# Patient Record
Sex: FEMALE | URBAN - METROPOLITAN AREA
[De-identification: names, ages, dates, MRNs, and addresses within clinical notes are randomized per-mention and may not be internally consistent; named-entity substitution may affect disease eponyms.]

---

## 2021-02-18 ENCOUNTER — APPOINTMENT (RX ONLY)
Dept: URBAN - METROPOLITAN AREA CLINIC 77 | Facility: CLINIC | Age: 61
Setting detail: DERMATOLOGY
End: 2021-02-18

## 2021-02-18 ENCOUNTER — APPOINTMENT (OUTPATIENT)
Age: 61
Setting detail: DERMATOLOGY
End: 2021-02-18

## 2021-02-18 ENCOUNTER — APPOINTMENT (RX ONLY)
Dept: URBAN - METROPOLITAN AREA CLINIC 168 | Facility: CLINIC | Age: 61
Setting detail: DERMATOLOGY
End: 2021-02-18

## 2021-02-18 DIAGNOSIS — L73.8 OTHER SPECIFIED FOLLICULAR DISORDERS: ICD-10-CM

## 2021-02-18 PROCEDURE — 99212 OFFICE O/P EST SF 10 MIN: CPT

## 2021-02-18 PROCEDURE — ? DEFER

## 2021-02-18 PROCEDURE — ? COUNSELING

## 2021-02-18 ASSESSMENT — LOCATION DETAILED DESCRIPTION DERM
LOCATION DETAILED: SUPERIOR THORACIC SPINE
LOCATION DETAILED: SUPERIOR THORACIC SPINE
LOCATION DETAILED: RIGHT SUPERIOR MEDIAL UPPER BACK
LOCATION DETAILED: SUPERIOR THORACIC SPINE

## 2021-02-18 ASSESSMENT — LOCATION ZONE DERM
LOCATION ZONE: TRUNK

## 2021-02-18 ASSESSMENT — LOCATION SIMPLE DESCRIPTION DERM
LOCATION SIMPLE: UPPER BACK
LOCATION SIMPLE: RIGHT UPPER BACK
LOCATION SIMPLE: UPPER BACK
LOCATION SIMPLE: UPPER BACK
LOCATION SIMPLE: RIGHT UPPER BACK
LOCATION SIMPLE: RIGHT UPPER BACK

## 2021-02-18 NOTE — PROCEDURE: DEFER
Introduction Text (Please End With A Colon): The following procedure was deferred: punch excision
Procedure To Be Performed At Next Visit: Excision by punch method
Detail Level: Detailed

## 2021-03-10 ENCOUNTER — APPOINTMENT (OUTPATIENT)
Age: 61
Setting detail: DERMATOLOGY
End: 2021-03-10

## 2021-03-10 ENCOUNTER — APPOINTMENT (RX ONLY)
Dept: URBAN - METROPOLITAN AREA CLINIC 168 | Facility: CLINIC | Age: 61
Setting detail: DERMATOLOGY
End: 2021-03-10

## 2021-03-10 ENCOUNTER — APPOINTMENT (RX ONLY)
Dept: URBAN - METROPOLITAN AREA CLINIC 77 | Facility: CLINIC | Age: 61
Setting detail: DERMATOLOGY
End: 2021-03-10

## 2021-03-10 DIAGNOSIS — Z41.9 ENCOUNTER FOR PROCEDURE FOR PURPOSES OTHER THAN REMEDYING HEALTH STATE, UNSPECIFIED: ICD-10-CM

## 2021-03-10 PROCEDURE — ? DERMAPLANE

## 2021-03-10 ASSESSMENT — LOCATION DETAILED DESCRIPTION DERM
LOCATION DETAILED: LEFT CENTRAL MALAR CHEEK
LOCATION DETAILED: INFERIOR MID FOREHEAD
LOCATION DETAILED: RIGHT CENTRAL MALAR CHEEK
LOCATION DETAILED: INFERIOR MID FOREHEAD
LOCATION DETAILED: RIGHT CENTRAL MALAR CHEEK
LOCATION DETAILED: LEFT CENTRAL MALAR CHEEK
LOCATION DETAILED: RIGHT CENTRAL MALAR CHEEK
LOCATION DETAILED: LEFT CENTRAL MALAR CHEEK
LOCATION DETAILED: INFERIOR MID FOREHEAD

## 2021-03-10 ASSESSMENT — LOCATION ZONE DERM
LOCATION ZONE: FACE

## 2021-03-10 ASSESSMENT — LOCATION SIMPLE DESCRIPTION DERM
LOCATION SIMPLE: LEFT CHEEK
LOCATION SIMPLE: RIGHT CHEEK
LOCATION SIMPLE: RIGHT CHEEK
LOCATION SIMPLE: LEFT CHEEK
LOCATION SIMPLE: INFERIOR FOREHEAD
LOCATION SIMPLE: INFERIOR FOREHEAD
LOCATION SIMPLE: RIGHT CHEEK
LOCATION SIMPLE: INFERIOR FOREHEAD
LOCATION SIMPLE: LEFT CHEEK

## 2021-03-10 NOTE — HPI: COSMETIC (FACIAL)
Have You Had A Facial Before?: has not had a previous facial
Additional History: Dermaplaning performed add on peel $40\\nGrowth factor eye serum\\n

## 2021-03-10 NOTE — PROCEDURE: DERMAPLANE
Treatment Areas: face and neck
Blade: 10 blade scalpel
Post-Care Instructions: I reviewed with the patient in detail post-care instructions.
Pre-Procedure Text: The patient was placed in a recumbant position on the procedure table.
Price (Use Numbers Only, No Special Characters Or $): 106 Amanda Stern
Post-Procedure Instructions: Following the dermaplane procedure, Oxymist treatment was applied to the treatment areas. Moisturizer and SPF was applied.
Detail Level: Zone

## 2021-03-11 ENCOUNTER — APPOINTMENT (OUTPATIENT)
Age: 61
Setting detail: DERMATOLOGY
End: 2021-03-11

## 2021-03-11 ENCOUNTER — APPOINTMENT (RX ONLY)
Dept: URBAN - METROPOLITAN AREA CLINIC 168 | Facility: CLINIC | Age: 61
Setting detail: DERMATOLOGY
End: 2021-03-11

## 2021-03-11 ENCOUNTER — APPOINTMENT (RX ONLY)
Dept: URBAN - METROPOLITAN AREA CLINIC 77 | Facility: CLINIC | Age: 61
Setting detail: DERMATOLOGY
End: 2021-03-11

## 2021-03-11 DIAGNOSIS — D485 NEOPLASM OF UNCERTAIN BEHAVIOR OF SKIN: ICD-10-CM

## 2021-03-11 PROBLEM — D48.5 NEOPLASM OF UNCERTAIN BEHAVIOR OF SKIN: Status: ACTIVE | Noted: 2021-03-11

## 2021-03-11 PROCEDURE — 11104 PUNCH BX SKIN SINGLE LESION: CPT

## 2021-03-11 PROCEDURE — ? BIOPSY BY PUNCH METHOD

## 2021-03-11 PROCEDURE — 11105 PUNCH BX SKIN EA SEP/ADDL: CPT

## 2021-03-11 ASSESSMENT — LOCATION SIMPLE DESCRIPTION DERM
LOCATION SIMPLE: UPPER BACK
LOCATION SIMPLE: UPPER BACK
LOCATION SIMPLE: RIGHT UPPER BACK
LOCATION SIMPLE: UPPER BACK

## 2021-03-11 ASSESSMENT — LOCATION ZONE DERM
LOCATION ZONE: TRUNK

## 2021-03-11 ASSESSMENT — LOCATION DETAILED DESCRIPTION DERM
LOCATION DETAILED: RIGHT SUPERIOR UPPER BACK
LOCATION DETAILED: INFERIOR THORACIC SPINE
LOCATION DETAILED: INFERIOR THORACIC SPINE
LOCATION DETAILED: RIGHT SUPERIOR UPPER BACK
LOCATION DETAILED: INFERIOR THORACIC SPINE
LOCATION DETAILED: RIGHT SUPERIOR UPPER BACK

## 2021-03-11 NOTE — PROCEDURE: BIOPSY BY PUNCH METHOD
Patient Will Remove Sutures At Home?: No
Wound Care: Polysporin ointment
Anesthesia Type: 1% lidocaine with epinephrine
X Depth Of Punch In Cm (Optional): 0
Biopsy Type: H and E
Detail Level: Detailed
Notification Instructions: Patient will be notified of biopsy results. However, patient instructed to call the office if not contacted within 2 weeks.
Information: Selecting Yes will display possible errors in your note based on the variables you have selected. This validation is only offered as a suggestion for you. PLEASE NOTE THAT THE VALIDATION TEXT WILL BE REMOVED WHEN YOU FINALIZE YOUR NOTE. IF YOU WANT TO FAX A PRELIMINARY NOTE YOU WILL NEED TO TOGGLE THIS TO 'NO' IF YOU DO NOT WANT IT IN YOUR FAXED NOTE.
Consent: Written consent was obtained and risks were reviewed including but not limited to scarring, infection, bleeding, scabbing, incomplete removal, nerve damage and allergy to anesthesia.
Home Suture Removal Text: Patient was provided a home suture removal kit and will remove their sutures at home. If they have any questions or difficulties they will call the office.
Validate Note Data (See Information Below): Yes
Suture Removal: 14 days
Home Suture Removal Text: Patient was provided a home suture removal kit and will remove their sutures at home.  If they have any questions or difficulties they will call the office.
Punch Size In Mm: 2
Post-Care Instructions: I reviewed with the patient in detail post-care instructions. Patient is to keep the biopsy site dry overnight, and then apply bacitracin twice daily until healed. Patient may apply hydrogen peroxide soaks to remove any crusting.
Billing Type: [employer]
Number Of Epidermal Sutures (Optional): 1
Hemostasis: None
Billing Type: Third-Party Bill
Epidermal Sutures: 4-0 Monosof
Dressing: bandage
Anesthesia Volume In Cc (Will Not Render If 0): 1.5

## 2021-03-24 ENCOUNTER — APPOINTMENT (OUTPATIENT)
Age: 61
Setting detail: DERMATOLOGY
End: 2021-03-24

## 2021-03-24 ENCOUNTER — APPOINTMENT (RX ONLY)
Dept: URBAN - METROPOLITAN AREA CLINIC 77 | Facility: CLINIC | Age: 61
Setting detail: DERMATOLOGY
End: 2021-03-24

## 2021-03-24 ENCOUNTER — APPOINTMENT (RX ONLY)
Dept: URBAN - METROPOLITAN AREA CLINIC 168 | Facility: CLINIC | Age: 61
Setting detail: DERMATOLOGY
End: 2021-03-24

## 2021-03-24 DIAGNOSIS — Z48.02 ENCOUNTER FOR REMOVAL OF SUTURES: ICD-10-CM

## 2021-03-24 DIAGNOSIS — L72.0 EPIDERMAL CYST: ICD-10-CM

## 2021-03-24 PROCEDURE — 99212 OFFICE O/P EST SF 10 MIN: CPT

## 2021-03-24 PROCEDURE — ? COUNSELING

## 2021-03-24 PROCEDURE — ? SUTURE REMOVAL (NO GLOBAL PERIOD)

## 2021-03-24 ASSESSMENT — LOCATION SIMPLE DESCRIPTION DERM
LOCATION SIMPLE: UPPER BACK
LOCATION SIMPLE: UPPER BACK
LOCATION SIMPLE: LEFT UPPER BACK
LOCATION SIMPLE: UPPER BACK

## 2021-03-24 ASSESSMENT — LOCATION DETAILED DESCRIPTION DERM
LOCATION DETAILED: SUPERIOR THORACIC SPINE
LOCATION DETAILED: LEFT SUPERIOR MEDIAL UPPER BACK
LOCATION DETAILED: SUPERIOR THORACIC SPINE
LOCATION DETAILED: LEFT SUPERIOR MEDIAL UPPER BACK
LOCATION DETAILED: SUPERIOR THORACIC SPINE
LOCATION DETAILED: LEFT SUPERIOR MEDIAL UPPER BACK

## 2021-03-24 ASSESSMENT — LOCATION ZONE DERM
LOCATION ZONE: TRUNK

## 2021-04-27 ENCOUNTER — APPOINTMENT (RX ONLY)
Age: 61
Setting detail: DERMATOLOGY
End: 2021-04-27

## 2021-04-27 ENCOUNTER — APPOINTMENT (OUTPATIENT)
Age: 61
Setting detail: DERMATOLOGY
End: 2021-04-27

## 2021-04-27 DIAGNOSIS — Z41.9 ENCOUNTER FOR PROCEDURE FOR PURPOSES OTHER THAN REMEDYING HEALTH STATE, UNSPECIFIED: ICD-10-CM

## 2021-04-27 PROCEDURE — ? TREATMENT REGIMEN

## 2021-04-27 PROCEDURE — ? COSMETIC CONSULTATION: FILLERS

## 2021-04-27 PROCEDURE — ? PRODUCT LINE (ZO SKIN HEALTH)

## 2021-04-27 ASSESSMENT — LOCATION DETAILED DESCRIPTION DERM
LOCATION DETAILED: LEFT CENTRAL MALAR CHEEK
LOCATION DETAILED: LEFT CHIN
LOCATION DETAILED: LEFT CENTRAL MALAR CHEEK
LOCATION DETAILED: LEFT CHIN

## 2021-04-27 ASSESSMENT — LOCATION SIMPLE DESCRIPTION DERM
LOCATION SIMPLE: LEFT CHEEK
LOCATION SIMPLE: LEFT CHEEK
LOCATION SIMPLE: CHIN
LOCATION SIMPLE: CHIN

## 2021-04-27 ASSESSMENT — LOCATION ZONE DERM
LOCATION ZONE: FACE
LOCATION ZONE: FACE

## 2021-04-27 NOTE — PROCEDURE: PRODUCT LINE ([facility])
Product 27 Application Directions: Apply to entire face and chest QAM
Product 3 Price (In Dollars - Numeric Only, No Special Characters Or $): 0.00
Product 15 Application Directions: Apply to eyes BID
Product 32 Units: 0
Name Of Product 24: Calming toner
Name Of Product 31: Wrinkle and Texture Repair
Name Of Product 20: Retinol Brightener
Name Of Product 19: Tretinoin 0.05% (20g)
Name Of Product 6: Daily Power Defense
Detail Level: Zone
Name Of Product 11: Firming Serum
Product 22 Application Directions: Apply to eyelids and crows feet BID
Name Of Product 16: Wrinkle Texture Repair
Product 20 Application Directions: Apply to entire face QOS as tolerated
Product 9 Application Directions: Apply HS
Name Of Product 5: Vitamin C serum
Name Of Product 29: ZO Broad SPF 50
Name Of Product 30: Radical Night Repair
Name Of Product 27: SPF Brush 40 Fair
Product 19 Application Directions: Apply QHS as tolerated
Name Of Product 7: Pore refiner
Product 8 Units: 1
Product 28 Application Directions: Apply to entire face QHS PRN
Name Of Product 8: Exfoliating accelerator
Product 6 Application Directions: Apply to entire face BID
Name Of Product 18: ZO SPF Primer
Product 29 Application Directions: Apply to entire face QAM and reapply as needed.
Name Of Product 1: Gentle cleanser
Name Of Product 4: Pigment control HQ4% RX
Product 31 Application Directions: Apply at bedtime 2 and increase to 3-4 x week as tolerated after cleansing and pad.
Product 10 Application Directions: Apply to entire face BID as directed
Name Of Product 17: Complexion renewal pads
Name Of Product 14: Dual Action Scrub
Product 2 Application Directions: 3 to 5 x week after cleansing
Product 4 Application Directions: Apply to entire face for pigmentation QAM
Risk Of Complication Category: No MDM
Name Of Product 25: Hydrafirm / Brightening Eye Cream
Assigning Risk Information: Per AMA, level of risk is based upon consequences of the problem(s) addressed at the encounter when appropriately treated. Risk also includes medical decision making related to the need to initiate or forego further testing, treatment and/or hospitalization. Over the counter medication are assigned a risk level of low. Prescription medication management is assigned a risk level of moderate.
Name Of Product 2: Exfoliating polish
Product 5 Application Directions: Apply QAM
Product 25 Application Directions: Apply to periorbital region QHS
Allow Plan To Count Towards E/M Coding: No (this will remove associated impression from E/M calculation)
Product 24 Application Directions: Apply to entire face BID after cleansing
Name Of Product 22: Intense Eye Repair Cream
Product 21 Application Directions: Swab BID after cleansing
Product 3 Application Directions: Apply to entire face BID after Daily Power Defense
Name Of Product 23: Gentle cleanser travel size
Name Of Product 28: Hydrating Cream
Product 1 Application Directions: Apply to entire face QHS
Name Of Product 15: Growth Factor Serum
Product 32 Application Directions: Apply to entire face QOS or alternating with Retinol
Product 18 Application Directions: Apply to entire face QAM
Name Of Product 9: Pigment HQ4% blending RX
Send Charges To Patient Encounter: No
Product 14 Application Directions: Scrub 3-4 times a week after cleansing
Product 11 Application Directions: Apply BID
Product 23 Application Directions: Cleanse face BID
Name Of Product 10: Complexion Renewal Kit
Name Of Product 26: Maryjaneve
Name Of Product 21: Oil Control Pads
Product 16 Application Directions: Apply to entire face QHS as tolerated
Name Of Product 3: Rozatrol
Name Of Product 12: Exfoliating polish travel size

## 2021-04-27 NOTE — PROCEDURE: PRODUCT LINE ([facility])
Product 7 Units: 0
Product 41 Price (In Dollars - Numeric Only, No Special Characters Or $): 0.00
Name Of Product 21: Oil Control Pads
Product 1 Application Directions: Apply to entire face QHS
Name Of Product 26: Shonave
Name Of Product 13: Radical night repair
Product 15 Application Directions: Apply to eyes BID
Product 7 Application Directions: Apply to entire face QAM
Product 23 Application Directions: Cleanse face BID
Product 28 Application Directions: Apply to entire face QHS PRN
Detail Level: Zone
Name Of Product 2: Exfoliating polish
Product 4 Application Directions: Apply to entire face for pigmentation QAM
Name Of Product 8: Exfoliating accelerator
Name Of Product 16: Wrinkle Texture Repair
Name Of Product 29: ZO Broad SPF 50
Product 10 Application Directions: Apply to entire face BID as directed
Product 31 Application Directions: Apply at bedtime 2 and increase to 3-4 x week as tolerated after cleansing and pad.
Name Of Product 19: Tretinoin 0.05% (20g)
Product 5 Application Directions: Apply QAM
Name Of Product 5: Vitamin C serum
Name Of Product 24: Calming toner
Product 21 Application Directions: Swab BID after cleansing
Assigning Risk Information: Per AMA, level of risk is based upon consequences of the problem(s) addressed at the encounter when appropriately treated. Risk also includes medical decision making related to the need to initiate or forego further testing, treatment and/or hospitalization. Over the counter medication are assigned a risk level of low. Prescription medication management is assigned a risk level of moderate.
Product 26 Application Directions: Apply to entire face BID
Name Of Product 11: Firming Serum
Name Of Product 32: Growth Factor Serum
Risk Of Complication Category: No MDM
Product 29 Units: 1
Product 2 Application Directions: 3 to 5 x week after cleansing
Name Of Product 6: Daily Power Defense
Product 16 Application Directions: Apply to entire face QHS as tolerated
Name Of Product 22: Intense Eye Repair Cream
Name Of Product 14: Dual Action Scrub
Product 29 Application Directions: Apply to entire face QAM and reapply as needed.
Name Of Product 27: SPF Brush 40 Fair
Allow Plan To Count Towards E/M Coding: No (this will remove associated impression from E/M calculation)
Name Of Product 3: Rozatrol
Product 19 Application Directions: Apply QHS as tolerated
Product 24 Application Directions: Apply to entire face BID after cleansing
Name Of Product 17: Complexion renewal pads
Name Of Product 9: Pigment HQ4% blending RX
Product 11 Application Directions: Apply BID
Send Charges To Patient Encounter: No
Name Of Product 20: Retinol Brightener
Name Of Product 25: Hydrafirm / Brightening Eye Cream
Product 22 Application Directions: Apply to eyelids and crows feet BID
Name Of Product 12: Exfoliating polish travel size
Product 27 Application Directions: Apply to entire face and chest QAM
Product 14 Application Directions: Scrub 3-4 times a week after cleansing
Name Of Product 1: Gentle cleanser
Name Of Product 7: Pore refiner
Name Of Product 23: Gentle cleanser travel size
Name Of Product 28: Hydrating Cream
Product 3 Application Directions: Apply to entire face BID after Daily Power Defense
Product 9 Application Directions: Apply HS
Product 32 Application Directions: Apply to entire face QOS or alternating with Retinol
Name Of Product 18: ZO SPF Primer
Product 25 Application Directions: Apply to periorbital region QHS
Name Of Product 4: Pigment control HQ4% RX
Product 20 Application Directions: Apply to entire face QOS as tolerated
Name Of Product 31: Wrinkle and Texture Repair
Name Of Product 10: Complexion Renewal Kit

## 2021-05-07 ENCOUNTER — APPOINTMENT (OUTPATIENT)
Age: 61
Setting detail: DERMATOLOGY
End: 2021-05-07

## 2021-05-07 ENCOUNTER — APPOINTMENT (RX ONLY)
Age: 61
Setting detail: DERMATOLOGY
End: 2021-05-07

## 2021-05-07 DIAGNOSIS — Z41.9 ENCOUNTER FOR PROCEDURE FOR PURPOSES OTHER THAN REMEDYING HEALTH STATE, UNSPECIFIED: ICD-10-CM

## 2021-05-07 PROCEDURE — ? FACIAL

## 2021-05-07 ASSESSMENT — LOCATION SIMPLE DESCRIPTION DERM
LOCATION SIMPLE: LEFT FOREHEAD
LOCATION SIMPLE: LEFT CHEEK
LOCATION SIMPLE: LEFT FOREHEAD
LOCATION SIMPLE: RIGHT CHEEK
LOCATION SIMPLE: RIGHT CHEEK
LOCATION SIMPLE: LEFT CHEEK

## 2021-05-07 ASSESSMENT — LOCATION DETAILED DESCRIPTION DERM
LOCATION DETAILED: LEFT INFERIOR CENTRAL MALAR CHEEK
LOCATION DETAILED: LEFT INFERIOR MEDIAL FOREHEAD
LOCATION DETAILED: LEFT INFERIOR MEDIAL FOREHEAD
LOCATION DETAILED: RIGHT INFERIOR CENTRAL MALAR CHEEK
LOCATION DETAILED: RIGHT INFERIOR CENTRAL MALAR CHEEK
LOCATION DETAILED: LEFT INFERIOR CENTRAL MALAR CHEEK

## 2021-05-07 ASSESSMENT — LOCATION ZONE DERM
LOCATION ZONE: FACE
LOCATION ZONE: FACE

## 2021-05-07 NOTE — PROCEDURE: FACIAL
Detail Level: Zone
Mask Type (Optional): hydrating B5
Exfoliation Type: dermaplane
Treatment Type (Optional): Sensitive
Extraction Method: 11 blade and comedo extractor
Facial Steaming: steamed
Price (Use Numbers Only, No Special Characters Or $): 75

## 2021-05-07 NOTE — PROCEDURE: FACIAL
Extraction Method: 11 blade and comedo extractor
Exfoliation Type: dermaplane
Facial Steaming: steamed
Treatment Type (Optional): Sensitive
Price (Use Numbers Only, No Special Characters Or $): 75
Mask Type (Optional): hydrating B5
Detail Level: Zone

## 2021-05-11 ENCOUNTER — APPOINTMENT (RX ONLY)
Age: 61
Setting detail: DERMATOLOGY
End: 2021-05-11

## 2021-05-11 ENCOUNTER — APPOINTMENT (OUTPATIENT)
Age: 61
Setting detail: DERMATOLOGY
End: 2021-05-11

## 2021-05-11 VITALS
DIASTOLIC BLOOD PRESSURE: 80 MMHG | SYSTOLIC BLOOD PRESSURE: 120 MMHG | SYSTOLIC BLOOD PRESSURE: 120 MMHG | DIASTOLIC BLOOD PRESSURE: 80 MMHG

## 2021-05-11 DIAGNOSIS — Z41.9 ENCOUNTER FOR PROCEDURE FOR PURPOSES OTHER THAN REMEDYING HEALTH STATE, UNSPECIFIED: ICD-10-CM

## 2021-05-11 PROCEDURE — ? OTHER

## 2021-05-11 PROCEDURE — ? FILLERS

## 2021-05-11 ASSESSMENT — LOCATION ZONE DERM
LOCATION ZONE: FACE
LOCATION ZONE: FACE
LOCATION ZONE: LIP
LOCATION ZONE: LIP

## 2021-05-11 ASSESSMENT — LOCATION DETAILED DESCRIPTION DERM
LOCATION DETAILED: RIGHT CHIN
LOCATION DETAILED: RIGHT SUPERIOR MEDIAL BUCCAL CHEEK
LOCATION DETAILED: RIGHT LOWER CUTANEOUS LIP
LOCATION DETAILED: RIGHT SUPERIOR MEDIAL BUCCAL CHEEK
LOCATION DETAILED: RIGHT CHIN
LOCATION DETAILED: RIGHT LOWER CUTANEOUS LIP
LOCATION DETAILED: RIGHT MEDIAL BUCCAL CHEEK
LOCATION DETAILED: LEFT CHIN
LOCATION DETAILED: RIGHT MEDIAL BUCCAL CHEEK
LOCATION DETAILED: LEFT CENTRAL BUCCAL CHEEK
LOCATION DETAILED: LEFT CHIN
LOCATION DETAILED: LEFT MEDIAL BUCCAL CHEEK
LOCATION DETAILED: LEFT LOWER CUTANEOUS LIP
LOCATION DETAILED: LEFT LOWER CUTANEOUS LIP
LOCATION DETAILED: LEFT MEDIAL BUCCAL CHEEK
LOCATION DETAILED: LEFT CENTRAL BUCCAL CHEEK

## 2021-05-11 ASSESSMENT — LOCATION SIMPLE DESCRIPTION DERM
LOCATION SIMPLE: RIGHT LIP
LOCATION SIMPLE: RIGHT CHEEK
LOCATION SIMPLE: CHIN
LOCATION SIMPLE: RIGHT LIP
LOCATION SIMPLE: RIGHT CHEEK
LOCATION SIMPLE: LEFT LIP
LOCATION SIMPLE: CHIN
LOCATION SIMPLE: LEFT LIP
LOCATION SIMPLE: LEFT CHEEK
LOCATION SIMPLE: LEFT CHEEK

## 2021-05-11 NOTE — PROCEDURE: OTHER
Note Text (......Xxx Chief Complaint.): This diagnosis correlates with the
Detail Level: Zone
Other (Free Text): 3 syringes of Aktana Ultra used
Render Risk Assessment In Note?: no

## 2021-05-11 NOTE — PROCEDURE: OTHER
Detail Level: Zone
Other (Free Text): 3 syringes of Novasentis Ultra used
Render Risk Assessment In Note?: no
Note Text (......Xxx Chief Complaint.): This diagnosis correlates with the

## 2021-05-11 NOTE — HPI: COSMETIC (FILLERS)
Have You Had Fillers Before?: has not had fillers
Additional History: She has had  January 2020 nasal labial folds done [facility].

## 2021-05-11 NOTE — PROCEDURE: FILLERS
Price (Use Numbers Only, No Special Characters Or $): 1500.00
Dorsal Hands Filler Volume In Cc: 0
Use Map Statement For Sites (Optional): Yes
Additional Area 2 Location: Chin
Consent: Written consent obtained. Risks include but not limited to bruising, beading, irregular texture, ulceration, infection, allergic reaction, scar formation, incomplete augmentation, temporary nature, procedural pain.
Map Statment: See Attach Map for Complete Details
Lot #: [id number]
Post-Care Instructions: Patient instructed to apply ice to reduce swelling.
Expiration Date (Month Year): 04/03/2022
Include Cannula Information In Note?: No
Anesthesia Type: 0.3% lidocaine (mixed within filler)
Lot #: [id number]
Expiration Date (Month Year): 03/21/2021
Filler: Juvederm Ultra XC
Filler Comments: Patient declined to participate in the [employer] rewards program.
Detail Level: Detailed

## 2021-05-11 NOTE — PROCEDURE: FILLERS
Lateral Face Filler Volume In Cc: 0
Include Cannula Information In Note?: No
Use Map Statement For Sites (Optional): Yes
Lot #: [id number]
Post-Care Instructions: Patient instructed to apply ice to reduce swelling.
Map Statment: See Attach Map for Complete Details
Price (Use Numbers Only, No Special Characters Or $): 1500.00
Expiration Date (Month Year): 04/03/2022
Additional Area 1 Location: chin
Anesthesia Type: 0.3% lidocaine (mixed within filler)
Consent: Written consent obtained. Risks include but not limited to bruising, beading, irregular texture, ulceration, infection, allergic reaction, scar formation, incomplete augmentation, temporary nature, procedural pain.
Expiration Date (Month Year): 03/21/2021
Lot #: [id number]
Detail Level: Detailed
Filler: Juvederm Ultra XC
Filler Comments: Patient declined to participate in the [employer] rewards program.

## 2021-06-01 ENCOUNTER — APPOINTMENT (OUTPATIENT)
Age: 61
Setting detail: DERMATOLOGY
End: 2021-06-01

## 2021-06-01 ENCOUNTER — APPOINTMENT (RX ONLY)
Age: 61
Setting detail: DERMATOLOGY
End: 2021-06-01

## 2021-06-01 DIAGNOSIS — I78.8 OTHER DISEASES OF CAPILLARIES: ICD-10-CM

## 2021-06-01 DIAGNOSIS — Z41.9 ENCOUNTER FOR PROCEDURE FOR PURPOSES OTHER THAN REMEDYING HEALTH STATE, UNSPECIFIED: ICD-10-CM

## 2021-06-01 PROCEDURE — ? FACIAL

## 2021-06-01 PROCEDURE — ? COSMETIC CONSULTATION: FILLERS

## 2021-06-01 PROCEDURE — ? TREATMENT REGIMEN

## 2021-06-01 PROCEDURE — ? COUNSELING

## 2021-06-01 PROCEDURE — ? BOTOX

## 2021-06-01 PROCEDURE — ? COSMETIC FOLLOW-UP

## 2021-06-01 ASSESSMENT — LOCATION SIMPLE DESCRIPTION DERM
LOCATION SIMPLE: RIGHT FOREHEAD
LOCATION SIMPLE: GLABELLA
LOCATION SIMPLE: CHIN
LOCATION SIMPLE: LEFT FOREHEAD
LOCATION SIMPLE: RIGHT CHEEK
LOCATION SIMPLE: RIGHT CHEEK
LOCATION SIMPLE: LEFT CHEEK
LOCATION SIMPLE: LEFT FOREHEAD
LOCATION SIMPLE: LEFT CHEEK
LOCATION SIMPLE: RIGHT CHEEK
LOCATION SIMPLE: RIGHT CHEEK
LOCATION SIMPLE: LEFT CHEEK
LOCATION SIMPLE: LEFT FOREHEAD
LOCATION SIMPLE: LEFT CHEEK
LOCATION SIMPLE: CHIN
LOCATION SIMPLE: LEFT FOREHEAD
LOCATION SIMPLE: GLABELLA
LOCATION SIMPLE: RIGHT FOREHEAD

## 2021-06-01 ASSESSMENT — LOCATION DETAILED DESCRIPTION DERM
LOCATION DETAILED: LEFT SUPERIOR LATERAL MALAR CHEEK
LOCATION DETAILED: LEFT INFERIOR CENTRAL MALAR CHEEK
LOCATION DETAILED: LEFT CHIN
LOCATION DETAILED: RIGHT INFERIOR MEDIAL FOREHEAD
LOCATION DETAILED: LEFT LATERAL FOREHEAD
LOCATION DETAILED: GLABELLA
LOCATION DETAILED: LEFT INFERIOR FOREHEAD
LOCATION DETAILED: RIGHT LATERAL FOREHEAD
LOCATION DETAILED: LEFT SUPERIOR FOREHEAD
LOCATION DETAILED: LEFT INFERIOR MEDIAL MALAR CHEEK
LOCATION DETAILED: LEFT SUPERIOR LATERAL MALAR CHEEK
LOCATION DETAILED: LEFT SUPERIOR CENTRAL MALAR CHEEK
LOCATION DETAILED: RIGHT INFERIOR CENTRAL MALAR CHEEK
LOCATION DETAILED: LEFT LATERAL FOREHEAD
LOCATION DETAILED: RIGHT LATERAL MALAR CHEEK
LOCATION DETAILED: LEFT INFERIOR MEDIAL FOREHEAD
LOCATION DETAILED: LEFT SUPERIOR CENTRAL MALAR CHEEK
LOCATION DETAILED: RIGHT LATERAL MALAR CHEEK
LOCATION DETAILED: RIGHT LATERAL FOREHEAD
LOCATION DETAILED: RIGHT SUPERIOR FOREHEAD
LOCATION DETAILED: LEFT CHIN
LOCATION DETAILED: RIGHT INFERIOR CENTRAL MALAR CHEEK
LOCATION DETAILED: RIGHT SUPERIOR LATERAL MALAR CHEEK
LOCATION DETAILED: RIGHT INFERIOR CENTRAL MALAR CHEEK
LOCATION DETAILED: LEFT INFERIOR FOREHEAD
LOCATION DETAILED: LEFT SUPERIOR FOREHEAD
LOCATION DETAILED: RIGHT INFERIOR CENTRAL MALAR CHEEK
LOCATION DETAILED: RIGHT SUPERIOR LATERAL MALAR CHEEK
LOCATION DETAILED: LEFT INFERIOR CENTRAL MALAR CHEEK
LOCATION DETAILED: RIGHT INFERIOR MEDIAL FOREHEAD
LOCATION DETAILED: LEFT INFERIOR MEDIAL MALAR CHEEK
LOCATION DETAILED: LEFT INFERIOR MEDIAL FOREHEAD
LOCATION DETAILED: GLABELLA
LOCATION DETAILED: RIGHT SUPERIOR FOREHEAD

## 2021-06-01 ASSESSMENT — LOCATION ZONE DERM
LOCATION ZONE: FACE

## 2021-06-01 NOTE — PROCEDURE: MIPS QUALITY
Quality 111:Pneumonia Vaccination Status For Older Adults: Pneumococcal Vaccination not Administered or Previously Received, Reason not Otherwise Specified
Detail Level: Detailed
Quality 226: Preventive Care And Screening: Tobacco Use: Screening And Cessation Intervention: Patient screened for tobacco use and is an ex/non-smoker
Quality 110: Preventive Care And Screening: Influenza Immunization: Influenza Immunization not Administered for Documented Reasons.
Quality 431: Preventive Care And Screening: Unhealthy Alcohol Use - Screening: Patient screened for unhealthy alcohol use using a single question and scores less than 2 times per year

## 2021-06-01 NOTE — PROCEDURE: MIPS QUALITY
Quality 226: Preventive Care And Screening: Tobacco Use: Screening And Cessation Intervention: Patient screened for tobacco use and is an ex/non-smoker
Quality 110: Preventive Care And Screening: Influenza Immunization: Influenza Immunization not Administered for Documented Reasons.
Quality 431: Preventive Care And Screening: Unhealthy Alcohol Use - Screening: Patient screened for unhealthy alcohol use using a single question and scores less than 2 times per year
Quality 111:Pneumonia Vaccination Status For Older Adults: Pneumococcal Vaccination not Administered or Previously Received, Reason not Otherwise Specified
Detail Level: Detailed

## 2021-06-01 NOTE — PROCEDURE: TREATMENT REGIMEN
Detail Level: Zone
Plan: Voluma in the cheeks.
Detail Level: Detailed
Plan: The patient is told that she can schedule a consultation for laser with Dr. Vincent.

## 2021-06-01 NOTE — PROCEDURE: TREATMENT REGIMEN
Plan: Voluma in the cheeks.
Detail Level: Zone
Plan: The patient is told that she can schedule a consultation for laser with Dr. Walsh.
Detail Level: Detailed

## 2021-06-01 NOTE — PROCEDURE: BOTOX
Additional Area 3 Units: 0
Show Additional Area 3: Yes
Show Lcl Units: No
Expiration Date (Month Year): 09/2023
Forehead Units: 12
Price (Use Numbers Only, No Special Characters Or $): 420.00
Lot #: [id number]
Consent: Written consent obtained. Risks include but not limited to lid/brow ptosis, bruising, swelling, diplopia, temporary effect, incomplete chemical denervation.
Periorbital Skin Units: 8
Detail Level: Detailed
Dilution (U/0.1 Cc): 5
Post-Care Instructions: Patient instructed to not lie down for 4 hours and limit physical activity for 24 hours. Patient instructed not to travel by airplane for 48 hours.
Glabellar Complex Units: 15

## 2021-06-01 NOTE — PROCEDURE: FACIAL
Extraction Method: 11 blade and comedo extractor
Exfoliation Type: dermaplane
Detail Level: Zone
Facial Steaming: steamed
Treatment Type (Optional): Sensitive
Price (Use Numbers Only, No Special Characters Or $): 75
Mask Type (Optional): hydrating B5

## 2021-06-01 NOTE — PROCEDURE: COSMETIC FOLLOW-UP
Side Effects Or Complications: Swelling
Detail Level: Detailed
Treatment (Optional): Filler Injection
Patient Satisfaction: Pleased

## 2021-06-01 NOTE — PROCEDURE: BOTOX
Show Right And Left Brow Units: No
Inferior Lateral Orbicularis Oculi Units: 0
Show Masseter Units: Yes
Price (Use Numbers Only, No Special Characters Or $): 420.00
Expiration Date (Month Year): 09/2023
Forehead Units: 12
Post-Care Instructions: Patient instructed to not lie down for 4 hours and limit physical activity for 24 hours. Patient instructed not to travel by airplane for 48 hours.
Glabellar Complex Units: 15
Dilution (U/0.1 Cc): 5
Detail Level: Detailed
Consent: Written consent obtained. Risks include but not limited to lid/brow ptosis, bruising, swelling, diplopia, temporary effect, incomplete chemical denervation.
Lot #: [id number]
Periorbital Skin Units: 8

## 2021-06-22 ENCOUNTER — APPOINTMENT (RX ONLY)
Age: 61
Setting detail: DERMATOLOGY
End: 2021-06-22

## 2021-06-22 ENCOUNTER — APPOINTMENT (OUTPATIENT)
Age: 61
Setting detail: DERMATOLOGY
End: 2021-06-22

## 2021-06-22 DIAGNOSIS — Z41.9 ENCOUNTER FOR PROCEDURE FOR PURPOSES OTHER THAN REMEDYING HEALTH STATE, UNSPECIFIED: ICD-10-CM

## 2021-06-22 PROCEDURE — ? COSMETIC CONSULTATION: GENERAL

## 2021-06-22 PROCEDURE — ? DIAGNOSIS COMMENT

## 2021-06-22 PROCEDURE — ? COSMETIC CONSULTATION: FILLERS

## 2021-06-22 PROCEDURE — ? BOTOX

## 2021-06-22 ASSESSMENT — LOCATION SIMPLE DESCRIPTION DERM
LOCATION SIMPLE: RIGHT CHEEK
LOCATION SIMPLE: LEFT CHEEK
LOCATION SIMPLE: LEFT ZYGOMA
LOCATION SIMPLE: LEFT CHEEK
LOCATION SIMPLE: LEFT ZYGOMA
LOCATION SIMPLE: RIGHT ZYGOMA
LOCATION SIMPLE: RIGHT CHEEK
LOCATION SIMPLE: RIGHT ZYGOMA

## 2021-06-22 ASSESSMENT — LOCATION DETAILED DESCRIPTION DERM
LOCATION DETAILED: LEFT MEDIAL ZYGOMA
LOCATION DETAILED: RIGHT INFERIOR MEDIAL MALAR CHEEK
LOCATION DETAILED: LEFT MEDIAL ZYGOMA
LOCATION DETAILED: LEFT INFERIOR CENTRAL MALAR CHEEK
LOCATION DETAILED: LEFT SUPERIOR MEDIAL BUCCAL CHEEK
LOCATION DETAILED: RIGHT INFERIOR CENTRAL MALAR CHEEK
LOCATION DETAILED: RIGHT MEDIAL ZYGOMA
LOCATION DETAILED: RIGHT INFERIOR CENTRAL MALAR CHEEK
LOCATION DETAILED: RIGHT INFERIOR MEDIAL MALAR CHEEK
LOCATION DETAILED: LEFT SUPERIOR MEDIAL BUCCAL CHEEK
LOCATION DETAILED: LEFT INFERIOR CENTRAL MALAR CHEEK
LOCATION DETAILED: RIGHT MEDIAL ZYGOMA

## 2021-06-22 ASSESSMENT — LOCATION ZONE DERM
LOCATION ZONE: FACE
LOCATION ZONE: FACE

## 2021-06-22 NOTE — PROCEDURE: BOTOX
Detail Level: Detailed
Show Nasal Units: Yes
Periorbital Skin Units: 4
Expiration Date (Month Year): 08/2022
Anterior Platysmal Bands Units: 0
Lot #: [id number]
Show Mentalis Units: No
Consent: Written consent obtained. Risks include but not limited to lid/brow ptosis, bruising, swelling, diplopia, temporary effect, incomplete chemical denervation.
Dilution (U/0.1 Cc): 5
Post-Care Instructions: Patient instructed to not lie down for 4 hours and limit physical activity for 24 hours. Patient instructed not to travel by airplane for 48 hours.

## 2021-06-22 NOTE — PROCEDURE: DIAGNOSIS COMMENT
Comment: 2 units of Botox in each crows feet added- complimentary samples used from [facility]
Detail Level: Simple
Render Risk Assessment In Note?: no

## 2021-06-22 NOTE — PROCEDURE: BOTOX
L Brow Units: 0
Show Additional Area 5: Yes
Detail Level: Detailed
Show Right And Left Pupillary Line Units: No
Lot #: [id number]
Expiration Date (Month Year): 08/2022
Consent: Written consent obtained. Risks include but not limited to lid/brow ptosis, bruising, swelling, diplopia, temporary effect, incomplete chemical denervation.
Periorbital Skin Units: 4
Post-Care Instructions: Patient instructed to not lie down for 4 hours and limit physical activity for 24 hours. Patient instructed not to travel by airplane for 48 hours.
Dilution (U/0.1 Cc): 5

## 2021-06-22 NOTE — PROCEDURE: MIPS QUALITY
Quality 110: Preventive Care And Screening: Influenza Immunization: Influenza Immunization not Administered for Documented Reasons.
Detail Level: Detailed
Quality 111:Pneumonia Vaccination Status For Older Adults: Pneumococcal Vaccination not Administered or Previously Received, Reason not Otherwise Specified
Quality 226: Preventive Care And Screening: Tobacco Use: Screening And Cessation Intervention: Patient screened for tobacco use and is an ex/non-smoker
Quality 431: Preventive Care And Screening: Unhealthy Alcohol Use - Screening: Patient screened for unhealthy alcohol use using a single question and scores less than 2 times per year

## 2021-06-22 NOTE — PROCEDURE: DIAGNOSIS COMMENT
Detail Level: Simple
Comment: 2 units of Botox in each crows feet added- complimentary samples used from [facility]
Render Risk Assessment In Note?: no

## 2021-06-29 ENCOUNTER — APPOINTMENT (OUTPATIENT)
Age: 61
Setting detail: DERMATOLOGY
End: 2021-06-29

## 2021-06-29 ENCOUNTER — APPOINTMENT (RX ONLY)
Age: 61
Setting detail: DERMATOLOGY
End: 2021-06-29

## 2021-06-29 DIAGNOSIS — Z41.9 ENCOUNTER FOR PROCEDURE FOR PURPOSES OTHER THAN REMEDYING HEALTH STATE, UNSPECIFIED: ICD-10-CM

## 2021-06-29 PROCEDURE — ? FILLERS

## 2021-06-29 ASSESSMENT — LOCATION DETAILED DESCRIPTION DERM
LOCATION DETAILED: LEFT CENTRAL MALAR CHEEK
LOCATION DETAILED: RIGHT UPPER CUTANEOUS LIP
LOCATION DETAILED: RIGHT ORAL COMMISSURE
LOCATION DETAILED: LEFT LATERAL MALAR CHEEK
LOCATION DETAILED: RIGHT LATERAL MALAR CHEEK
LOCATION DETAILED: LEFT CENTRAL MALAR CHEEK
LOCATION DETAILED: LEFT LATERAL MALAR CHEEK
LOCATION DETAILED: LEFT INFERIOR VERMILION LIP
LOCATION DETAILED: RIGHT UPPER CUTANEOUS LIP
LOCATION DETAILED: LEFT SUPERIOR VERMILION LIP
LOCATION DETAILED: RIGHT ORAL COMMISSURE
LOCATION DETAILED: RIGHT LATERAL MALAR CHEEK
LOCATION DETAILED: LEFT INFERIOR VERMILION LIP
LOCATION DETAILED: RIGHT LOWER CUTANEOUS LIP
LOCATION DETAILED: RIGHT LOWER CUTANEOUS LIP
LOCATION DETAILED: LEFT SUPERIOR VERMILION LIP

## 2021-06-29 ASSESSMENT — LOCATION SIMPLE DESCRIPTION DERM
LOCATION SIMPLE: LEFT CHEEK
LOCATION SIMPLE: RIGHT CHEEK
LOCATION SIMPLE: RIGHT LIP
LOCATION SIMPLE: RIGHT CHEEK
LOCATION SIMPLE: RIGHT LIP
LOCATION SIMPLE: LEFT LIP
LOCATION SIMPLE: LEFT LIP
LOCATION SIMPLE: LEFT CHEEK

## 2021-06-29 ASSESSMENT — LOCATION ZONE DERM
LOCATION ZONE: FACE
LOCATION ZONE: FACE
LOCATION ZONE: LIP
LOCATION ZONE: LIP

## 2021-06-29 NOTE — PROCEDURE: FILLERS
Vermilion Lips Filler Volume In Cc: 0
Post-Care Instructions: Patient instructed to apply ice to reduce swelling.
Detail Level: Detailed
Marionette Lines Filler Volume In Cc: 1
Include Cannula Information In Note?: No
Filler: Juvederm Voluma XC
Anesthesia Expiration Date (Month Year): 03-
Lot #: [id number]
Price (Use Numbers Only, No Special Characters Or $): 2100.00
Anesthesia Type: 0.3% lidocaine (mixed within filler)
Expiration Date (Month Year): 03/15/2022
Use Map Statement For Sites (Optional): Yes
Map Statment: See Attach Map for Complete Details
Lot #: [id number]
Consent: Written consent obtained. Risks include but not limited to bruising, beading, irregular texture, ulceration, infection, allergic reaction, scar formation, incomplete augmentation, temporary nature, procedural pain.
Additional Area 2 Location: Chin
Expiration Date (Month Year): 06/14/2022
Anesthesia Lot #: [id number]
Filler: Juvederm Ultra XC

## 2021-06-29 NOTE — PROCEDURE: FILLERS
Additional Area 4 Volume In Cc: 0
Filler: Juvederm Ultra XC
Lot #: [id number]
Include Cannula Information In Note?: No
Expiration Date (Month Year): 03/15/2022
Anesthesia Lot #: [id number]
Anesthesia Expiration Date (Month Year): 03-
Marionette Lines Filler Volume In Cc: 1
Filler: Juvederm Voluma XC
Lot #: [id number]
Detail Level: Detailed
Price (Use Numbers Only, No Special Characters Or $): 2100.00
Expiration Date (Month Year): 06/14/2022
Use Map Statement For Sites (Optional): Yes
Map Statment: See Attach Map for Complete Details
Additional Area 2 Location: Chin
Anesthesia Type: 0.3% lidocaine (mixed within filler)
Post-Care Instructions: Patient instructed to apply ice to reduce swelling.
Consent: Written consent obtained. Risks include but not limited to bruising, beading, irregular texture, ulceration, infection, allergic reaction, scar formation, incomplete augmentation, temporary nature, procedural pain.

## 2021-12-01 ENCOUNTER — APPOINTMENT (RX ONLY)
Dept: URBAN - METROPOLITAN AREA CLINIC 168 | Facility: CLINIC | Age: 61
Setting detail: DERMATOLOGY
End: 2021-12-01

## 2021-12-01 ENCOUNTER — APPOINTMENT (OUTPATIENT)
Age: 61
Setting detail: DERMATOLOGY
End: 2021-12-01

## 2021-12-01 ENCOUNTER — APPOINTMENT (RX ONLY)
Dept: URBAN - METROPOLITAN AREA CLINIC 77 | Facility: CLINIC | Age: 61
Setting detail: DERMATOLOGY
End: 2021-12-01

## 2021-12-01 DIAGNOSIS — L72.8 OTHER FOLLICULAR CYSTS OF THE SKIN AND SUBCUTANEOUS TISSUE: ICD-10-CM

## 2021-12-01 PROCEDURE — 99212 OFFICE O/P EST SF 10 MIN: CPT

## 2021-12-01 PROCEDURE — ? COUNSELING

## 2021-12-01 ASSESSMENT — LOCATION SIMPLE DESCRIPTION DERM
LOCATION SIMPLE: UPPER BACK

## 2021-12-01 ASSESSMENT — LOCATION ZONE DERM
LOCATION ZONE: TRUNK

## 2021-12-01 ASSESSMENT — LOCATION DETAILED DESCRIPTION DERM
LOCATION DETAILED: INFERIOR THORACIC SPINE

## 2023-06-16 ENCOUNTER — APPOINTMENT (RX ONLY)
Dept: URBAN - METROPOLITAN AREA CLINIC 77 | Facility: CLINIC | Age: 63
Setting detail: DERMATOLOGY
End: 2023-06-16

## 2023-06-16 DIAGNOSIS — L72.8 OTHER FOLLICULAR CYSTS OF THE SKIN AND SUBCUTANEOUS TISSUE: ICD-10-CM

## 2023-06-16 PROCEDURE — ? DEFER

## 2023-06-16 PROCEDURE — 99212 OFFICE O/P EST SF 10 MIN: CPT

## 2023-06-16 PROCEDURE — ? COUNSELING

## 2023-06-16 PROCEDURE — ? CHRONOLOGY OF PRESENT ILLNESS

## 2023-06-16 ASSESSMENT — LOCATION ZONE DERM: LOCATION ZONE: TRUNK

## 2023-06-16 ASSESSMENT — LOCATION DETAILED DESCRIPTION DERM: LOCATION DETAILED: INFERIOR THORACIC SPINE

## 2023-06-16 ASSESSMENT — LOCATION SIMPLE DESCRIPTION DERM: LOCATION SIMPLE: UPPER BACK

## 2023-06-16 NOTE — PROCEDURE: CHRONOLOGY OF PRESENT ILLNESS
Detail Level: Zone
Chronology Of Present Illness: 6/16/23\\ntreated with punch excision in the past. . Per pt stitch came out after excision and is still present. Offered to repeat punch excision procedure today and pt declined. Will schedule a separate appt and perform punch excision then.